# Patient Record
Sex: FEMALE | URBAN - METROPOLITAN AREA
[De-identification: names, ages, dates, MRNs, and addresses within clinical notes are randomized per-mention and may not be internally consistent; named-entity substitution may affect disease eponyms.]

---

## 2017-01-05 ENCOUNTER — IMPORTED ENCOUNTER (OUTPATIENT)
Dept: URBAN - METROPOLITAN AREA CLINIC 1 | Facility: CLINIC | Age: 82
End: 2017-01-05

## 2017-01-05 PROBLEM — H40.1231: Noted: 2017-01-05

## 2017-01-05 PROBLEM — H26.493: Noted: 2017-01-05

## 2017-01-05 PROBLEM — Z96.1: Noted: 2017-01-05

## 2017-01-05 PROBLEM — H04.123: Noted: 2017-01-05

## 2017-01-05 PROBLEM — H35.3113: Noted: 2017-01-05

## 2017-01-05 PROBLEM — H16.143: Noted: 2017-01-05

## 2017-01-05 PROBLEM — H35.3223: Noted: 2017-01-05

## 2017-01-05 PROBLEM — H35.3222: Noted: 2017-01-05

## 2017-01-05 PROBLEM — H02.834: Noted: 2017-01-05

## 2017-01-05 PROBLEM — H02.831: Noted: 2017-01-05

## 2017-01-05 PROCEDURE — 92133 CPTRZD OPH DX IMG PST SGM ON: CPT

## 2017-01-05 PROCEDURE — 92014 COMPRE OPH EXAM EST PT 1/>: CPT

## 2017-01-05 NOTE — PATIENT DISCUSSION
1.  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 Advanced thinning by OCT OU. Start monotrial of Latanoprost OD QHS (Sample of [place] given prior to leaving) . Condition was discussed with patient and patient understands. Will continue to monitor patient for any progression in condition. Patient was advised to call us with any problems questions or concerns. 2.  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 Importance of daily AREDS II study multivitamin and Amsler Grid checks discussed with patient. Patient to follow-up immediately with any new onset of decreased vision and/or metamorphopsia. 3. 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 Keep appointments with [facility].4. LATISHA w/ PEK OU- The continuation of artificial tears were recommended. 5.  PCO OU: Observe and consider yag cap when pt feels pco visually significant and visual acuity decreases to appropriate level. 6. Dermatochalasis OU UL's- Follow with no intervention at this time. 7. Pseudophakia OU- Doing well. 8.  Return for an appointment for a 10/check IOP on monotrial OD in 1 month with Dr. Sonali Biggs.

## 2017-02-21 ENCOUNTER — IMPORTED ENCOUNTER (OUTPATIENT)
Dept: URBAN - METROPOLITAN AREA CLINIC 1 | Facility: CLINIC | Age: 82
End: 2017-02-21

## 2017-02-21 PROBLEM — H40.1231: Noted: 2017-02-21

## 2017-02-21 PROCEDURE — 92012 INTRM OPH EXAM EST PATIENT: CPT

## 2017-02-21 NOTE — PATIENT DISCUSSION
1.  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 Will use Latanoprost QHS  OU. Rx sent to [facility]. Condition was discussed with patient and patient understands. Will continue to monitor patient for any progression in condition. Patient was advised to call us with any problems questions or concerns. 2.  H/o ARMD OD advanced without subfoveal involvement/dry 3. H/o Wet ARMD OS 4. LATISHA w/ PEK OU- Cont ATs TID OU routinely. 5.  PCO OU: Observe  6. Dermatochalasis OU UL's- Observe 7. Pseudophakia OU- Doing well. Return for an appointment in 5 mo 10 dfe glare Vf 24-2 OU with Dr. José Miguel Ball.

## 2017-09-25 ENCOUNTER — IMPORTED ENCOUNTER (OUTPATIENT)
Dept: URBAN - METROPOLITAN AREA CLINIC 1 | Facility: CLINIC | Age: 82
End: 2017-09-25

## 2017-09-25 PROBLEM — H02.831: Noted: 2017-09-25

## 2017-09-25 PROBLEM — H02.834: Noted: 2017-09-25

## 2017-09-25 PROBLEM — H04.123: Noted: 2017-09-25

## 2017-09-25 PROBLEM — H26.493: Noted: 2017-09-25

## 2017-09-25 PROBLEM — H35.3114: Noted: 2017-09-25

## 2017-09-25 PROBLEM — H40.1231: Noted: 2017-09-25

## 2017-09-25 PROBLEM — Z96.1: Noted: 2017-09-25

## 2017-09-25 PROBLEM — H35.3223: Noted: 2017-09-25

## 2017-09-25 PROBLEM — H16.143: Noted: 2017-09-25

## 2017-09-25 PROCEDURE — 92083 EXTENDED VISUAL FIELD XM: CPT

## 2017-09-25 PROCEDURE — 92012 INTRM OPH EXAM EST PATIENT: CPT

## 2017-09-25 NOTE — PATIENT DISCUSSION
1.  Wet regressed ARMD OS: stable with inactive scar. Currently receiving injections by Dr. Ariella Macdonald. H/o Anti-VEGF injections by Dr. Ariella Macdonald. 2.  ARMD OD advanced with subfoveal involvement/dry/stable. Importance of daily AREDS II study multivitamin and Amsler Grid checks discussed with patient. Patient to follow-up immediately with any new onset of decreased vision and/or metamorphopsia. 3. 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 Normal HVF OD. HVF defect OS likely secondary to ARMD. Patient to switch to Lumigan OU QHS (pt needs larger bottle size to help opening bottle.) Condition was discussed with patient and patient understands. Will continue to monitor patient for any progression in condition. Patient was advised to call us with any problems questions or concerns. 4.  PCO OU-Visually Significant and yag cap recommended. Risks and benefits discussed with pt and pt desires to schedule yag cap. 5. LATISHA w/ PEK OU- Progressed OU. The increase of artificial tears OU to TID were recommended. 6.  Dermatochalasis OU UL's- Follow with no intervention at this time. 7. Pseudophakia OU- Doing well. 8.  Return for an appointment for Yag OD then OS in 1-6 weeks with Dr. Edouard Ramey.

## 2017-10-20 ENCOUNTER — IMPORTED ENCOUNTER (OUTPATIENT)
Dept: URBAN - METROPOLITAN AREA CLINIC 1 | Facility: CLINIC | Age: 82
End: 2017-10-20

## 2017-10-20 PROCEDURE — 66821 AFTER CATARACT LASER SURGERY: CPT

## 2017-10-20 NOTE — PATIENT DISCUSSION
YAG CAP OD: (Consent signed and scanned into attachments) 1 gtt Prolensa applied. The purpose and nature of the procedure possible alternative methods of treatment the risks involved and the possibility of complications were discussed with patient. The Patient wishes to proceed and the consent was signed. The laser was then performed under topical anesthesia with no complications. Post op instructions were given to patient as well as a follow-up appointment. Patient was advised to call our office if any questions or concerns.  RTc as scheduled 2nd eye

## 2017-10-25 PROBLEM — H26.491: Noted: 2017-10-25

## 2017-11-03 ENCOUNTER — IMPORTED ENCOUNTER (OUTPATIENT)
Dept: URBAN - METROPOLITAN AREA CLINIC 1 | Facility: CLINIC | Age: 82
End: 2017-11-03

## 2017-11-03 PROBLEM — H26.492: Noted: 2017-11-03

## 2017-11-03 PROCEDURE — 66821 AFTER CATARACT LASER SURGERY: CPT

## 2017-11-03 NOTE — PATIENT DISCUSSION
YAG CAP OS: (Consent signed and scanned into attachments) 1 gtt Prolensa applied. The purpose and nature of the procedure possible alternative methods of treatment the risks involved and the possibility of complications were discussed with patient. The Patient wishes to proceed and the consent was signed. The laser was then performed under topical anesthesia with no complications. Post op instructions were given to patient as well as a follow-up appointment. Patient was advised to call our office if any questions or concerns.   RTC 1-4 weeks YAG PO.

## 2017-12-18 ENCOUNTER — IMPORTED ENCOUNTER (OUTPATIENT)
Dept: URBAN - METROPOLITAN AREA CLINIC 1 | Facility: CLINIC | Age: 82
End: 2017-12-18

## 2017-12-18 PROCEDURE — 99024 POSTOP FOLLOW-UP VISIT: CPT

## 2017-12-18 NOTE — PATIENT DISCUSSION
PO YAG Cap OU: good result. MRX given. Return for an appointment in 4-6 mo 30 OCT with Dr. Chino Alfaro.

## 2018-06-22 ENCOUNTER — IMPORTED ENCOUNTER (OUTPATIENT)
Dept: URBAN - METROPOLITAN AREA CLINIC 1 | Facility: CLINIC | Age: 83
End: 2018-06-22

## 2018-06-22 PROBLEM — H35.3114: Noted: 2018-06-22

## 2018-06-22 PROBLEM — H35.3221: Noted: 2018-06-22

## 2018-06-22 PROBLEM — H40.1231: Noted: 2018-06-22

## 2018-06-22 PROCEDURE — 92014 COMPRE OPH EXAM EST PT 1/>: CPT

## 2018-06-22 PROCEDURE — 92133 CPTRZD OPH DX IMG PST SGM ON: CPT

## 2018-06-22 NOTE — PATIENT DISCUSSION
1.  Wet ARMD OS: stablewith active choroidal neovascularization. Keep appointments with Dr. Chel Davdi. 2.  ARMD OD advanced with subfoveal involvement/dry/stable. Importance of daily AREDS II study multivitamin and Amsler Grid checks discussed with patient. Patient to follow-up immediately with any new onset of decreased vision and/or metamorphopsia. 3. 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 Cont Latanoprost QHS OU. Compliance with meds. 4.  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 Shailesh Shelby in the past can return to Dr. Shailesh Shelby for correction PRN. 5.  LATISHA w/ PEK OU- Cont ATs TID OU Routinely. 6.  Dermatochalasis OU UL's- Follow with no intervention at this time. 7. Pseudophakia OU- H/o YAG Cap OU. Letter to PCP Return for an appointment in 6 mo 10 dfe VF 24-2 OU with Dr. Isreal Alvarado.

## 2018-06-22 NOTE — PATIENT DISCUSSION
Mild Low tension glaucoma OU - -Patient to continue with current gtt regimen. Condition was discussed with patient and patient understands. Will continue to monitor patient for any progression in condition. Patient was advised to call us with any problems questions or concerns.

## 2018-06-22 NOTE — PATIENT DISCUSSION
ARMD OD advanced with subfoveal involvement/dry/stable. Importance of daily AREDS II study multivitamin and Amsler Grid checks discussed with patient. Patient to follow-up immediately with any new onset of decreased vision and/or metamorphopsia.

## 2018-12-07 ENCOUNTER — IMPORTED ENCOUNTER (OUTPATIENT)
Dept: URBAN - METROPOLITAN AREA CLINIC 1 | Facility: CLINIC | Age: 83
End: 2018-12-07

## 2018-12-07 PROBLEM — H02.831: Noted: 2018-12-07

## 2018-12-07 PROBLEM — H02.834: Noted: 2018-12-07

## 2018-12-07 PROBLEM — H35.3114: Noted: 2018-12-07

## 2018-12-07 PROBLEM — H02.423: Noted: 2018-12-07

## 2018-12-07 PROBLEM — H40.1131: Noted: 2018-12-07

## 2018-12-07 PROBLEM — H40.1231: Noted: 2018-12-07

## 2018-12-07 PROBLEM — H16.143: Noted: 2018-12-07

## 2018-12-07 PROBLEM — H35.3221: Noted: 2018-12-07

## 2018-12-07 PROBLEM — H04.123: Noted: 2018-12-07

## 2018-12-07 PROCEDURE — 92012 INTRM OPH EXAM EST PATIENT: CPT

## 2018-12-07 PROCEDURE — 92083 EXTENDED VISUAL FIELD XM: CPT

## 2018-12-07 NOTE — PATIENT DISCUSSION
1.  Wet ARMD OS: stablewith active choroidal neovascularization. Receiving injections OS. Keep appointments with Dr. Sonia Pradhan. 2. ARMD OD advanced with subfoveal involvement/dry/stable. Importance of daily AREDS II study multivitamin and Amsler Grid checks discussed with patient. Patient to follow-up immediately with any new onset of decreased vision and/or metamorphopsia. 3. Mild Low Tension Glaucoma OU (0.7/0.75)- Stable IOP and C/D OU. No progression by HVF defects OU. Inferior HVF defect OS likely artifact. Patient to continue with Latanoprost OU QHS. Condition was discussed with patient and patient understands. Will continue to monitor patient for any progression in condition. Patient was advised to call us with any problems questions or concerns. 3.  LATISHA w/ PEK OU-Recommend switching tears to PF OU QID routinely. 4. Dermatochalasis OU UL's- Follow with no intervention at this time. 5. Myogenic Ptosis OU -- Will continue to observe at this time. 6.  Return for an appointment for 30/OCT in 6 months with Dr. Wild Calvert.

## 2022-04-02 ASSESSMENT — VISUAL ACUITY
OD_SC: 20/20
OS_SC: 20/200
OD_SC: 20/20
OS_SC: 20/150-1
OS_SC: 20/150
OD_SC: 20/20
OD_GLARE: 20/60
OS_SC: 20/200
OD_SC: 20/25-1
OS_SC: 20/200-1
OS_SC: 20/150
OD_SC: 20/20
OD_GLARE: 20/40
OD_SC: 20/20

## 2022-04-02 ASSESSMENT — TONOMETRY
OS_IOP_MMHG: 14
OD_IOP_MMHG: 14
OS_IOP_MMHG: 12
OS_IOP_MMHG: 13
OD_IOP_MMHG: 10
OD_IOP_MMHG: 12
OS_IOP_MMHG: 9
OS_IOP_MMHG: 14
OD_IOP_MMHG: 13
OD_IOP_MMHG: 11
OS_IOP_MMHG: 11
OD_IOP_MMHG: 11

## 2022-04-02 ASSESSMENT — KERATOMETRY
OS_K2POWER_DIOPTERS: 42.75
OD_K2POWER_DIOPTERS: 42.00
OD_AXISANGLE2_DEGREES: 102
OD_K1POWER_DIOPTERS: 43.00
OD_AXISANGLE_DEGREES: 015
OS_AXISANGLE_DEGREES: 012
OS_AXISANGLE2_DEGREES: 102
OS_K1POWER_DIOPTERS: 43.25